# Patient Record
Sex: MALE | Race: WHITE | Employment: FULL TIME | URBAN - NONMETROPOLITAN AREA
[De-identification: names, ages, dates, MRNs, and addresses within clinical notes are randomized per-mention and may not be internally consistent; named-entity substitution may affect disease eponyms.]

---

## 2019-09-25 LAB
AVERAGE GLUCOSE: NORMAL
CHOLESTEROL, TOTAL: 148 MG/DL
CHOLESTEROL/HDL RATIO: 3.3
HBA1C MFR BLD: 5.2 %
HDLC SERPL-MCNC: 45 MG/DL (ref 35–70)
LDL CHOLESTEROL CALCULATED: 78 MG/DL (ref 0–160)
TRIGL SERPL-MCNC: 151 MG/DL
VLDLC SERPL CALC-MCNC: 103 MG/DL

## 2019-09-27 ENCOUNTER — OFFICE VISIT (OUTPATIENT)
Dept: FAMILY MEDICINE CLINIC | Age: 34
End: 2019-09-27
Payer: COMMERCIAL

## 2019-09-27 VITALS
BODY MASS INDEX: 25.82 KG/M2 | HEART RATE: 46 BPM | HEIGHT: 68 IN | WEIGHT: 170.4 LBS | SYSTOLIC BLOOD PRESSURE: 110 MMHG | DIASTOLIC BLOOD PRESSURE: 62 MMHG | RESPIRATION RATE: 16 BRPM

## 2019-09-27 DIAGNOSIS — R53.83 FATIGUE, UNSPECIFIED TYPE: ICD-10-CM

## 2019-09-27 DIAGNOSIS — J30.81 ALLERGIC RHINITIS DUE TO ANIMAL HAIR AND DANDER: ICD-10-CM

## 2019-09-27 DIAGNOSIS — Z00.01 ENCOUNTER FOR WELL ADULT EXAM WITH ABNORMAL FINDINGS: Primary | ICD-10-CM

## 2019-09-27 LAB
ALBUMIN SERPL-MCNC: 5 G/DL (ref 3.5–5.1)
ALP BLD-CCNC: 62 U/L (ref 38–126)
ALT SERPL-CCNC: 33 U/L (ref 11–66)
ANION GAP SERPL CALCULATED.3IONS-SCNC: 14 MEQ/L (ref 8–16)
AST SERPL-CCNC: 23 U/L (ref 5–40)
BASOPHILS # BLD: 1 %
BASOPHILS ABSOLUTE: 0.1 THOU/MM3 (ref 0–0.1)
BILIRUB SERPL-MCNC: 1.7 MG/DL (ref 0.3–1.2)
BUN BLDV-MCNC: 15 MG/DL (ref 7–22)
CALCIUM SERPL-MCNC: 10.1 MG/DL (ref 8.5–10.5)
CHLORIDE BLD-SCNC: 100 MEQ/L (ref 98–111)
CO2: 27 MEQ/L (ref 23–33)
CREAT SERPL-MCNC: 0.9 MG/DL (ref 0.4–1.2)
EOSINOPHIL # BLD: 3.5 %
EOSINOPHILS ABSOLUTE: 0.2 THOU/MM3 (ref 0–0.4)
ERYTHROCYTE [DISTWIDTH] IN BLOOD BY AUTOMATED COUNT: 12.1 % (ref 11.5–14.5)
ERYTHROCYTE [DISTWIDTH] IN BLOOD BY AUTOMATED COUNT: 38.8 FL (ref 35–45)
GFR SERPL CREATININE-BSD FRML MDRD: > 90 ML/MIN/1.73M2
GLUCOSE BLD-MCNC: 102 MG/DL (ref 70–108)
HCT VFR BLD CALC: 47.2 % (ref 42–52)
HEMOGLOBIN: 16.3 GM/DL (ref 14–18)
IMMATURE GRANS (ABS): 0.01 THOU/MM3 (ref 0–0.07)
IMMATURE GRANULOCYTES: 0.2 %
LYMPHOCYTES # BLD: 35.3 %
LYMPHOCYTES ABSOLUTE: 1.8 THOU/MM3 (ref 1–4.8)
MCH RBC QN AUTO: 30.4 PG (ref 26–33)
MCHC RBC AUTO-ENTMCNC: 34.5 GM/DL (ref 32.2–35.5)
MCV RBC AUTO: 88.1 FL (ref 80–94)
MONOCYTES # BLD: 7.4 %
MONOCYTES ABSOLUTE: 0.4 THOU/MM3 (ref 0.4–1.3)
NUCLEATED RED BLOOD CELLS: 0 /100 WBC
PLATELET # BLD: 228 THOU/MM3 (ref 130–400)
PMV BLD AUTO: 9.9 FL (ref 9.4–12.4)
POTASSIUM SERPL-SCNC: 4.3 MEQ/L (ref 3.5–5.2)
RBC # BLD: 5.36 MILL/MM3 (ref 4.7–6.1)
SEG NEUTROPHILS: 52.6 %
SEGMENTED NEUTROPHILS ABSOLUTE COUNT: 2.7 THOU/MM3 (ref 1.8–7.7)
SODIUM BLD-SCNC: 141 MEQ/L (ref 135–145)
TOTAL PROTEIN: 7.6 G/DL (ref 6.1–8)
TSH SERPL DL<=0.05 MIU/L-ACNC: 1.37 UIU/ML (ref 0.4–4.2)
WBC # BLD: 5.2 THOU/MM3 (ref 4.8–10.8)

## 2019-09-27 PROCEDURE — 99203 OFFICE O/P NEW LOW 30 MIN: CPT | Performed by: NURSE PRACTITIONER

## 2019-09-27 ASSESSMENT — ENCOUNTER SYMPTOMS
EYE DISCHARGE: 0
BACK PAIN: 0
DIARRHEA: 0
COUGH: 0
SINUS PRESSURE: 0
ABDOMINAL PAIN: 0
CONSTIPATION: 0
EYE REDNESS: 0
SORE THROAT: 1
EYE ITCHING: 0
SHORTNESS OF BREATH: 0
COLOR CHANGE: 0
EYE PAIN: 0

## 2019-09-27 ASSESSMENT — PATIENT HEALTH QUESTIONNAIRE - PHQ9
2. FEELING DOWN, DEPRESSED OR HOPELESS: 0
1. LITTLE INTEREST OR PLEASURE IN DOING THINGS: 0
SUM OF ALL RESPONSES TO PHQ9 QUESTIONS 1 & 2: 0
SUM OF ALL RESPONSES TO PHQ QUESTIONS 1-9: 0
SUM OF ALL RESPONSES TO PHQ QUESTIONS 1-9: 0

## 2019-09-27 NOTE — PROGRESS NOTES
[address] [place] [address]  Dept: [phone]  Dept Fax: [phone]: [phone]     Visit Date:  9/27/2019    Patient:  Ariela Robles  YOB: 1985  Age: 35 y.o. Gender: male  BMI: Body mass index is 25.91 kg/m². HPI:     Chief Complaint   Patient presents with    Established New Doctor     not feeling good. . dizzy x1 month, popping ears, sore throat       Ariela Robles is being seen today to establish care. Body mass index is 25.91 kg/m². reviewed with patient. Works as  for [employer]. Is from [place]. Has been experiencing lightheadedness, ears have been popping, and sore throat for approximately one month. Symptoms have waxed and waned. Previous minitriathalons previously. Has not run recently. States his heart rate has been low due to this. Medications  No current outpatient medications on file. The patient has No Known Allergies. Past Medical History  Concetta Pruett  has no past medical history on file. Past Surgical History  The patient  has no past surgical history on file. Family History  This patient's family history includes Early Death in his maternal grandfather; Heart Attack in his maternal grandfather; Mult Sclerosis in his maternal aunt and paternal aunt; No Known Problems in his father and mother; Parkinsonism in his paternal grandmother. Social History  Concetta Pruett  reports that he has never smoked. He has never used smokeless tobacco. He reports that he drank alcohol. He reports that he does not use drugs. Health Maintenance:    Health maintenance reviewed.    Health Maintenance   Topic Date Due    Varicella Vaccine (1 of 2 - 13+ 2-dose series) 11/27/1998    HIV screen  11/27/2000    DTaP/Tdap/Td vaccine (1 - Tdap) 11/27/2004    Flu vaccine (1) 09/01/2019    Pneumococcal 0-64 years Vaccine  Aged Out       Subjective:      Review of Systems

## 2019-09-27 NOTE — PATIENT INSTRUCTIONS
and mold. Allergies can be mild or severe. Mild allergies can be managed with home treatment. But medicine may be needed to prevent problems. Managing your allergies is an important part of staying healthy. Your doctor may suggest that you have allergy testing to help find out what is causing your allergies. When you know what things trigger your symptoms, you can avoid them. This can prevent allergy symptoms and other health problems. For severe allergies that cause reactions that affect your whole body (anaphylactic reactions), your doctor may prescribe a shot of epinephrine to carry with you in case you have a severe reaction. Learn how to give yourself the shot and keep it with you at all times. Make sure it is not . Follow-up care is a key part of your treatment and safety. Be sure to make and go to all appointments, and call your doctor if you are having problems. It's also a good idea to know your test results and keep a list of the medicines you take. How can you care for yourself at home? · If you have been told by your doctor that dust or dust mites are causing your allergy, decrease the dust around your bed:  ? Wash sheets, pillowcases, and other bedding in hot water every week. ? Use dust-proof covers for pillows, duvets, and mattresses. Avoid plastic covers because they tear easily and do not \"breathe. \" Wash as instructed on the label. ? Do not use any blankets and pillows that you do not need. ? Use blankets that you can wash in your washing machine. ? Consider removing drapes and carpets, which attract and hold dust, from your bedroom. · If you are allergic to house dust and mites, do not use home humidifiers. Your doctor can suggest ways you can control dust and mites. · Look for signs of cockroaches. Cockroaches cause allergic reactions. Use cockroach baits to get rid of them. Then, clean your home well.  Cockroaches like areas where grocery bags, newspapers, empty bottles, or

## 2019-09-30 ENCOUNTER — TELEPHONE (OUTPATIENT)
Dept: FAMILY MEDICINE CLINIC | Age: 34
End: 2019-09-30

## 2019-09-30 NOTE — TELEPHONE ENCOUNTER
----- Message from NEFTALI Jane CNP sent at 9/30/2019  7:49 AM EDT -----  Lab work look great. Blood sugar, electrolytes, kidney, liver, and blood counts are all okay.

## 2019-10-15 ENCOUNTER — OFFICE VISIT (OUTPATIENT)
Dept: FAMILY MEDICINE CLINIC | Age: 34
End: 2019-10-15
Payer: COMMERCIAL

## 2019-10-15 VITALS
DIASTOLIC BLOOD PRESSURE: 70 MMHG | RESPIRATION RATE: 14 BRPM | WEIGHT: 173 LBS | BODY MASS INDEX: 26.3 KG/M2 | SYSTOLIC BLOOD PRESSURE: 108 MMHG | HEART RATE: 76 BPM

## 2019-10-15 DIAGNOSIS — J01.90 ACUTE BACTERIAL SINUSITIS: Primary | ICD-10-CM

## 2019-10-15 DIAGNOSIS — B96.89 ACUTE BACTERIAL SINUSITIS: Primary | ICD-10-CM

## 2019-10-15 PROCEDURE — 99213 OFFICE O/P EST LOW 20 MIN: CPT | Performed by: NURSE PRACTITIONER

## 2019-10-15 RX ORDER — AMOXICILLIN AND CLAVULANATE POTASSIUM 875; 125 MG/1; MG/1
1 TABLET, FILM COATED ORAL 2 TIMES DAILY
Qty: 20 TABLET | Refills: 0 | Status: SHIPPED | OUTPATIENT
Start: 2019-10-15 | End: 2019-10-25

## 2019-10-15 RX ORDER — LORATADINE 10 MG/1
10 CAPSULE, LIQUID FILLED ORAL DAILY
COMMUNITY

## 2019-10-15 ASSESSMENT — ENCOUNTER SYMPTOMS
SINUS PRESSURE: 1
RHINORRHEA: 1
SORE THROAT: 1

## 2019-10-31 ENCOUNTER — TELEPHONE (OUTPATIENT)
Dept: FAMILY MEDICINE CLINIC | Age: 34
End: 2019-10-31

## 2020-02-11 ENCOUNTER — OFFICE VISIT (OUTPATIENT)
Dept: FAMILY MEDICINE CLINIC | Age: 35
End: 2020-02-11
Payer: COMMERCIAL

## 2020-02-11 VITALS
WEIGHT: 177 LBS | BODY MASS INDEX: 26.91 KG/M2 | SYSTOLIC BLOOD PRESSURE: 112 MMHG | RESPIRATION RATE: 14 BRPM | DIASTOLIC BLOOD PRESSURE: 70 MMHG | HEART RATE: 72 BPM

## 2020-02-11 PROCEDURE — 99213 OFFICE O/P EST LOW 20 MIN: CPT | Performed by: NURSE PRACTITIONER

## 2020-02-11 RX ORDER — AMOXICILLIN 500 MG/1
500 TABLET, FILM COATED ORAL 2 TIMES DAILY
Qty: 20 TABLET | Refills: 0 | Status: SHIPPED | OUTPATIENT
Start: 2020-02-11 | End: 2020-02-21

## 2020-02-11 ASSESSMENT — PATIENT HEALTH QUESTIONNAIRE - PHQ9
SUM OF ALL RESPONSES TO PHQ QUESTIONS 1-9: 0
2. FEELING DOWN, DEPRESSED OR HOPELESS: 0
SUM OF ALL RESPONSES TO PHQ9 QUESTIONS 1 & 2: 0
SUM OF ALL RESPONSES TO PHQ QUESTIONS 1-9: 0
1. LITTLE INTEREST OR PLEASURE IN DOING THINGS: 0

## 2020-02-11 ASSESSMENT — ENCOUNTER SYMPTOMS
SORE THROAT: 1
RHINORRHEA: 1
SHORTNESS OF BREATH: 0
COUGH: 0
SINUS PRESSURE: 1

## 2020-02-11 NOTE — PATIENT INSTRUCTIONS
chances of quitting for good. · Use a vaporizer or humidifier to add moisture to your bedroom. Follow the directions for cleaning the machine. When should you call for help? Call your doctor now or seek immediate medical care if:    · You have new or worse trouble swallowing.     · Your sore throat gets much worse on one side.    Watch closely for changes in your health, and be sure to contact your doctor if you do not get better as expected. Where can you learn more? Go to https://[employer]. org and sign in to your [employer] account. Enter O483 in the [employer] box to learn more about \"Sore Throat: Care Instructions. \"     If you do not have an account, please click on the \"Sign Up Now\" link. Current as of: July 28, 2019  Content Version: 12.3  © 7631-4085 Healthwise, Incorporated. Care instructions adapted under license by [facility] [address] If you have questions about a medical condition or this instruction, always ask your healthcare professional. [place] [address] any warranty or liability for your use of this information.

## 2023-04-18 ENCOUNTER — OFFICE VISIT (OUTPATIENT)
Dept: FAMILY MEDICINE CLINIC | Age: 38
End: 2023-04-18
Payer: COMMERCIAL

## 2023-04-18 VITALS
HEART RATE: 92 BPM | RESPIRATION RATE: 16 BRPM | SYSTOLIC BLOOD PRESSURE: 118 MMHG | WEIGHT: 171 LBS | DIASTOLIC BLOOD PRESSURE: 70 MMHG | BODY MASS INDEX: 26 KG/M2

## 2023-04-18 DIAGNOSIS — J01.10 ACUTE NON-RECURRENT FRONTAL SINUSITIS: Primary | ICD-10-CM

## 2023-04-18 PROCEDURE — 99203 OFFICE O/P NEW LOW 30 MIN: CPT | Performed by: NURSE PRACTITIONER

## 2023-04-18 RX ORDER — AMOXICILLIN AND CLAVULANATE POTASSIUM 875; 125 MG/1; MG/1
1 TABLET, FILM COATED ORAL 2 TIMES DAILY
Qty: 20 TABLET | Refills: 0 | Status: SHIPPED | OUTPATIENT
Start: 2023-04-18 | End: 2023-04-28

## 2023-04-18 SDOH — ECONOMIC STABILITY: HOUSING INSECURITY
IN THE LAST 12 MONTHS, WAS THERE A TIME WHEN YOU DID NOT HAVE A STEADY PLACE TO SLEEP OR SLEPT IN A SHELTER (INCLUDING NOW)?: NO

## 2023-04-18 SDOH — ECONOMIC STABILITY: FOOD INSECURITY: WITHIN THE PAST 12 MONTHS, YOU WORRIED THAT YOUR FOOD WOULD RUN OUT BEFORE YOU GOT MONEY TO BUY MORE.: NEVER TRUE

## 2023-04-18 SDOH — ECONOMIC STABILITY: INCOME INSECURITY: HOW HARD IS IT FOR YOU TO PAY FOR THE VERY BASICS LIKE FOOD, HOUSING, MEDICAL CARE, AND HEATING?: NOT HARD AT ALL

## 2023-04-18 SDOH — ECONOMIC STABILITY: FOOD INSECURITY: WITHIN THE PAST 12 MONTHS, THE FOOD YOU BOUGHT JUST DIDN'T LAST AND YOU DIDN'T HAVE MONEY TO GET MORE.: NEVER TRUE

## 2023-04-18 ASSESSMENT — ENCOUNTER SYMPTOMS
SHORTNESS OF BREATH: 0
RHINORRHEA: 1
COUGH: 1
SINUS PRESSURE: 1
SORE THROAT: 0

## 2023-04-18 ASSESSMENT — PATIENT HEALTH QUESTIONNAIRE - PHQ9
SUM OF ALL RESPONSES TO PHQ QUESTIONS 1-9: 0
SUM OF ALL RESPONSES TO PHQ QUESTIONS 1-9: 0
1. LITTLE INTEREST OR PLEASURE IN DOING THINGS: 0
SUM OF ALL RESPONSES TO PHQ QUESTIONS 1-9: 0
SUM OF ALL RESPONSES TO PHQ9 QUESTIONS 1 & 2: 0
2. FEELING DOWN, DEPRESSED OR HOPELESS: 0
SUM OF ALL RESPONSES TO PHQ QUESTIONS 1-9: 0

## 2023-04-18 NOTE — PROGRESS NOTES
Drea Montero (:  1985) is a 40 y.o. male,Established patient, here for evaluation of the following chief complaint(s):  Sinus Problem (X5 days ) and Established New Doctor (Patient has not been seen since 2020)         ASSESSMENT/PLAN:  1. Acute non-recurrent frontal sinusitis  - Acute  - Given improvement in symptoms, viral sinusitis is most likely  - Will put antibiotic on hold if symptoms worsen or do not completely resolve  - OTC treatments as needed for symptomatic relief  -     amoxicillin-clavulanate (AUGMENTIN) 875-125 MG per tablet; Take 1 tablet by mouth 2 times daily for 10 days, Disp-20 tablet, R-0Normal      Return if symptoms worsen or fail to improve. PHQ Scores 2023   PHQ2 Score 0 0 0   PHQ9 Score 0 0 0     Interpretation of Total Score Depression Severity: 1-4 = Minimal depression, 5-9 = Mild depression, 10-14 = Moderate depression, 15-19 = Moderately severe depression, 20-27 = Severe depression      Subjective   SUBJECTIVE/OBJECTIVE:  Patient presents today for evaluation of sinusitis and to reestablish care. Last seen in the office 3+ years ago. Health maintenance reviewed. No significant medical hx. No current specialists. Current concerns include sinus pressure. Ongoing for 5 days. Feeling a little better today. Sinusitis  This is a new problem. The current episode started in the past 7 days (5 days ago). The problem has been gradually improving since onset. There has been no fever. Associated symptoms include congestion, coughing and sinus pressure (frontal). Pertinent negatives include no ear pain, headaches, shortness of breath or sore throat (resolved). Past treatments include nasal decongestants and acetaminophen (Claritin-D, otc pain medication, nasal spray). The treatment provided mild relief. Review of Systems   Constitutional:  Positive for fatigue. Negative for fever.    HENT:  Positive for congestion, postnasal drip, rhinorrhea and

## 2023-06-21 ENCOUNTER — OFFICE VISIT (OUTPATIENT)
Dept: FAMILY MEDICINE CLINIC | Age: 38
End: 2023-06-21
Payer: COMMERCIAL

## 2023-06-21 VITALS
WEIGHT: 161.8 LBS | RESPIRATION RATE: 18 BRPM | BODY MASS INDEX: 24.6 KG/M2 | SYSTOLIC BLOOD PRESSURE: 122 MMHG | HEART RATE: 72 BPM | TEMPERATURE: 97.4 F | DIASTOLIC BLOOD PRESSURE: 68 MMHG
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
## 2023-06-21 DIAGNOSIS — S90.562A TICK BITE OF LEFT ANKLE, INITIAL ENCOUNTER: Primary | ICD-10-CM

## 2023-06-21 PROCEDURE — 99213 OFFICE O/P EST LOW 20 MIN: CPT | Performed by: FAMILY MEDICINE

## 2023-06-21 RX ORDER — DOXYCYCLINE HYCLATE 100 MG
100 TABLET ORAL 2 TIMES DAILY
Qty: 28 TABLET | Refills: 0 | Status: SHIPPED | OUTPATIENT
Start: 2023-06-21 | End: 2023-07-05

## 2023-06-21 ASSESSMENT — ENCOUNTER SYMPTOMS
DIARRHEA: 0
RHINORRHEA: 0
CONSTIPATION: 0
SINUS PRESSURE: 1
SORE THROAT: 0
EYE PAIN: 0
COUGH: 0
VOMITING: 0
CHEST TIGHTNESS: 0
WHEEZING: 0
NAUSEA: 0
ABDOMINAL PAIN: 0
BLOOD IN STOOL: 0
SHORTNESS OF BREATH: 0
BACK PAIN: 0

## 2023-06-21 NOTE — PROGRESS NOTES
Queen Cande (:  1985) is a 40 y.o. male,Established patient, here for evaluation of the following chief complaint(s):  Tick Removal (Removed on Tuesday, still swollen and now has cold symptoms)         ASSESSMENT/PLAN:  1. Tick bite of left ankle, initial encounter  -     doxycycline hyclate (VIBRA-TABS) 100 MG tablet; Take 1 tablet by mouth 2 times daily for 14 days, Disp-28 tablet, R-0Normal  -new problem, -monitor sxs, call if not improving      No follow-ups on file. Subjective   SUBJECTIVE/OBJECTIVE:  HPI   Patient here today for a check up. Reviewed BMI of 24, normal.  1 week of tick bite. Engaged about 2 days. Removed. , red. Located in [place]. Sinus sxs, congestion. No fever or chills. Some fatigue. , non smoker, pmh reviewed. Review of Systems   Constitutional:  Positive for fatigue. Negative for chills, fever and unexpected weight change. HENT:  Positive for postnasal drip and sinus pressure. Negative for congestion, ear pain, rhinorrhea and sore throat. Eyes:  Negative for pain and visual disturbance. Respiratory:  Negative for cough, chest tightness, shortness of breath and wheezing. Cardiovascular:  Negative for chest pain and palpitations. Gastrointestinal:  Negative for abdominal pain, blood in stool, constipation, diarrhea, nausea and vomiting. Genitourinary:  Negative for difficulty urinating, frequency, hematuria and urgency. Musculoskeletal:  Negative for back pain, joint swelling, myalgias and neck pain. Skin:  Positive for wound. Negative for rash. Neurological:  Negative for dizziness and headaches. Hematological:  Negative for adenopathy. Does not bruise/bleed easily. Psychiatric/Behavioral:  Negative for behavioral problems and sleep disturbance. The patient is not nervous/anxious. Objective   Physical Exam  Vitals and nursing note reviewed. Constitutional:       Appearance: He is well-developed.

## 2024-04-19 ENCOUNTER — OFFICE VISIT (OUTPATIENT)
Dept: FAMILY MEDICINE CLINIC | Age: 39
End: 2024-04-19
Payer: COMMERCIAL

## 2024-04-19 VITALS
DIASTOLIC BLOOD PRESSURE: 60 MMHG | BODY MASS INDEX: 26.46 KG/M2 | SYSTOLIC BLOOD PRESSURE: 118 MMHG | WEIGHT: 174 LBS | RESPIRATION RATE: 20 BRPM | HEART RATE: 64 BPM

## 2024-04-19 DIAGNOSIS — H92.03 ACUTE OTALGIA, BILATERAL: Primary | ICD-10-CM

## 2024-04-19 PROCEDURE — 99213 OFFICE O/P EST LOW 20 MIN: CPT | Performed by: NURSE PRACTITIONER

## 2024-04-19 RX ORDER — PREDNISONE 20 MG/1
TABLET ORAL
Qty: 15 TABLET | Refills: 0 | Status: SHIPPED | OUTPATIENT
Start: 2024-04-19 | End: 2024-04-28

## 2024-04-19 SDOH — ECONOMIC STABILITY: FOOD INSECURITY: WITHIN THE PAST 12 MONTHS, YOU WORRIED THAT YOUR FOOD WOULD RUN OUT BEFORE YOU GOT MONEY TO BUY MORE.: PATIENT DECLINED

## 2024-04-19 SDOH — ECONOMIC STABILITY: HOUSING INSECURITY
IN THE LAST 12 MONTHS, WAS THERE A TIME WHEN YOU DID NOT HAVE A STEADY PLACE TO SLEEP OR SLEPT IN A SHELTER (INCLUDING NOW)?: PATIENT DECLINED

## 2024-04-19 SDOH — ECONOMIC STABILITY: INCOME INSECURITY: HOW HARD IS IT FOR YOU TO PAY FOR THE VERY BASICS LIKE FOOD, HOUSING, MEDICAL CARE, AND HEATING?: PATIENT DECLINED

## 2024-04-19 SDOH — ECONOMIC STABILITY: FOOD INSECURITY: WITHIN THE PAST 12 MONTHS, THE FOOD YOU BOUGHT JUST DIDN'T LAST AND YOU DIDN'T HAVE MONEY TO GET MORE.: PATIENT DECLINED

## 2024-04-19 ASSESSMENT — ENCOUNTER SYMPTOMS
SINUS PRESSURE: 1
SORE THROAT: 1
COUGH: 1
HOARSE VOICE: 0
SINUS COMPLAINT: 1

## 2024-04-19 ASSESSMENT — PATIENT HEALTH QUESTIONNAIRE - PHQ9: DEPRESSION UNABLE TO ASSESS: PT REFUSES

## 2024-04-19 NOTE — PROGRESS NOTES
aMl Mei (:  1985) is a 38 y.o. male,Established patient, here for evaluation of the following chief complaint(s):  Sinus Problem (X2 weeks )      Assessment & Plan   1. Acute otalgia, bilateral  - Acute  - Appears to be d/t eustachian tube dysfunction. Likely related to allergic rhinitis and suspected recent viral illness  - Start oral steroid, oral antihistamine and nasal steroid  - Active infection seems unlikely at this time  -     predniSONE (DELTASONE) 20 MG tablet; Take 1 tablet by mouth 2 times daily at 0800 and 1400 for 5 days, THEN 1 tablet daily for 5 days., Disp-15 tablet, R-0Normal      Return if symptoms worsen or fail to improve.       Subjective   Patient presents for bilateral ear pressure.     Sinus Problem  This is a new problem. The problem has been gradually improving since onset. There has been no fever. Associated symptoms include congestion (resolved), coughing (nonproductive), ear pain (pressure, bilateral), sinus pressure (resolved) and a sore throat (resolved). Pertinent negatives include no headaches or hoarse voice.       Review of Systems   Constitutional:  Negative for fever.   HENT:  Positive for congestion (resolved), ear pain (pressure, bilateral), sinus pressure (resolved) and sore throat (resolved). Negative for hearing loss and hoarse voice.    Respiratory:  Positive for cough (nonproductive).    Musculoskeletal:  Negative for myalgias.   Neurological:  Negative for headaches.          Objective   Physical Exam  Vitals and nursing note reviewed.   Constitutional:       General: He is awake.      Appearance: Normal appearance.   HENT:      Head: Normocephalic and atraumatic.      Right Ear: Hearing, ear canal and external ear normal. Tympanic membrane is bulging. Tympanic membrane is not injected, perforated or erythematous.      Left Ear: Hearing, ear canal and external ear normal. Tympanic membrane is bulging. Tympanic membrane is not injected, perforated or

## 2024-09-12 ENCOUNTER — OFFICE VISIT (OUTPATIENT)
Dept: FAMILY MEDICINE CLINIC | Age: 39
End: 2024-09-12
Payer: COMMERCIAL

## 2024-09-12 VITALS
RESPIRATION RATE: 20 BRPM | HEART RATE: 80 BPM | WEIGHT: 175 LBS | SYSTOLIC BLOOD PRESSURE: 124 MMHG | BODY MASS INDEX: 26.61 KG/M2 | DIASTOLIC BLOOD PRESSURE: 70 MMHG

## 2024-09-12 DIAGNOSIS — R53.83 FATIGUE, UNSPECIFIED TYPE: Primary | ICD-10-CM

## 2024-09-12 LAB
25(OH)D3 SERPL-MCNC: 40 NG/ML (ref 30–100)
ALBUMIN SERPL BCG-MCNC: 5.2 G/DL (ref 3.5–5.1)
ALP SERPL-CCNC: 71 U/L (ref 38–126)
ALT SERPL W/O P-5'-P-CCNC: 22 U/L (ref 11–66)
ANION GAP SERPL CALC-SCNC: 15 MEQ/L (ref 8–16)
AST SERPL-CCNC: 21 U/L (ref 5–40)
BASOPHILS ABSOLUTE: 0.1 THOU/MM3 (ref 0–0.1)
BASOPHILS NFR BLD AUTO: 1 %
BILIRUB SERPL-MCNC: 1.3 MG/DL (ref 0.3–1.2)
BUN SERPL-MCNC: 19 MG/DL (ref 7–22)
CALCIUM SERPL-MCNC: 10 MG/DL (ref 8.5–10.5)
CHLORIDE SERPL-SCNC: 102 MEQ/L (ref 98–111)
CO2 SERPL-SCNC: 27 MEQ/L (ref 23–33)
CREAT SERPL-MCNC: 1 MG/DL (ref 0.4–1.2)
DEPRECATED RDW RBC AUTO: 38.5 FL (ref 35–45)
EOSINOPHIL NFR BLD AUTO: 6.8 %
EOSINOPHILS ABSOLUTE: 0.4 THOU/MM3 (ref 0–0.4)
ERYTHROCYTE [DISTWIDTH] IN BLOOD BY AUTOMATED COUNT: 12.3 % (ref 11.5–14.5)
FOLATE SERPL-MCNC: 15.1 NG/ML (ref 4.8–24.2)
GFR SERPL CREATININE-BSD FRML MDRD: > 90 ML/MIN/1.73M2
GLUCOSE SERPL-MCNC: 105 MG/DL (ref 70–108)
HCT VFR BLD AUTO: 47.6 % (ref 42–52)
HGB BLD-MCNC: 16.6 GM/DL (ref 14–18)
IMM GRANULOCYTES # BLD AUTO: 0.01 THOU/MM3 (ref 0–0.07)
IMM GRANULOCYTES NFR BLD AUTO: 0.2 %
LYMPHOCYTES ABSOLUTE: 1.9 THOU/MM3 (ref 1–4.8)
LYMPHOCYTES NFR BLD AUTO: 32.5 %
MCH RBC QN AUTO: 30.2 PG (ref 26–33)
MCHC RBC AUTO-ENTMCNC: 34.9 GM/DL (ref 32.2–35.5)
MCV RBC AUTO: 86.5 FL (ref 80–94)
MONOCYTES ABSOLUTE: 0.5 THOU/MM3 (ref 0.4–1.3)
MONOCYTES NFR BLD AUTO: 7.7 %
NEUTROPHILS ABSOLUTE: 3.1 THOU/MM3 (ref 1.8–7.7)
NEUTROPHILS NFR BLD AUTO: 51.8 %
NRBC BLD AUTO-RTO: 0 /100 WBC
PLATELET # BLD AUTO: 240 THOU/MM3 (ref 130–400)
PMV BLD AUTO: 10.3 FL (ref 9.4–12.4)
POTASSIUM SERPL-SCNC: 4.2 MEQ/L (ref 3.5–5.2)
PROT SERPL-MCNC: 7.9 G/DL (ref 6.1–8)
RBC # BLD AUTO: 5.5 MILL/MM3 (ref 4.7–6.1)
SODIUM SERPL-SCNC: 144 MEQ/L (ref 135–145)
TSH SERPL DL<=0.005 MIU/L-ACNC: 1.55 UIU/ML (ref 0.4–4.2)
VIT B12 SERPL-MCNC: 965 PG/ML (ref 211–911)
WBC # BLD AUTO: 5.9 THOU/MM3 (ref 4.8–10.8)

## 2024-09-12 PROCEDURE — 99214 OFFICE O/P EST MOD 30 MIN: CPT | Performed by: NURSE PRACTITIONER

## 2024-09-12 ASSESSMENT — PATIENT HEALTH QUESTIONNAIRE - PHQ9
SUM OF ALL RESPONSES TO PHQ9 QUESTIONS 1 & 2: 0
SUM OF ALL RESPONSES TO PHQ QUESTIONS 1-9: 0
1. LITTLE INTEREST OR PLEASURE IN DOING THINGS: NOT AT ALL
SUM OF ALL RESPONSES TO PHQ QUESTIONS 1-9: 0
2. FEELING DOWN, DEPRESSED OR HOPELESS: NOT AT ALL

## 2024-09-13 ENCOUNTER — TELEPHONE (OUTPATIENT)
Dept: FAMILY MEDICINE CLINIC | Age: 39
End: 2024-09-13

## 2024-09-13 DIAGNOSIS — R53.83 OTHER FATIGUE: ICD-10-CM

## 2024-09-13 DIAGNOSIS — R42 DIZZINESS: Primary | ICD-10-CM

## 2024-10-17 ENCOUNTER — TELEPHONE (OUTPATIENT)
Dept: FAMILY MEDICINE CLINIC | Age: 39
End: 2024-10-17

## 2024-10-17 NOTE — TELEPHONE ENCOUNTER
Pt wanted to schedule with Dr Rodriguez  Future Appointments   Date Time Provider Department Center   12/17/2024 12:40 PM Mark Quinones, DO [facility]

## 2024-10-17 NOTE — TELEPHONE ENCOUNTER
Based on my schedule right now, the best I can offer is my next new pt apt  He is welcome to schedule/EST with other providers in this office who could accommodate sooner  Thanks!

## 2024-10-17 NOTE — TELEPHONE ENCOUNTER
Pt requesting a new pt appt  Next available 12/17    Pt c/o low energy sore throat off and on  Severe fatigue  Arm spasms    He did lift weights three days a week-stopped for awhile  Trying to restart slowly

## 2024-12-16 SDOH — HEALTH STABILITY: PHYSICAL HEALTH
ON AVERAGE, HOW MANY DAYS PER WEEK DO YOU ENGAGE IN MODERATE TO STRENUOUS EXERCISE (LIKE A BRISK WALK)?: PATIENT DECLINED

## 2024-12-17 ENCOUNTER — OFFICE VISIT (OUTPATIENT)
Dept: FAMILY MEDICINE CLINIC | Age: 39
End: 2024-12-17
Payer: COMMERCIAL

## 2024-12-17 VITALS
HEART RATE: 79 BPM | TEMPERATURE: 97.6 F | RESPIRATION RATE: 18 BRPM | BODY MASS INDEX: 26.86 KG/M2 | SYSTOLIC BLOOD PRESSURE: 128 MMHG | OXYGEN SATURATION: 99 % | HEIGHT: 68 IN | WEIGHT: 177.2 LBS | DIASTOLIC BLOOD PRESSURE: 70 MMHG

## 2024-12-17 DIAGNOSIS — Z00.00 VISIT FOR PREVENTIVE HEALTH EXAMINATION: Primary | ICD-10-CM

## 2024-12-17 PROCEDURE — 99395 PREV VISIT EST AGE 18-39: CPT | Performed by: FAMILY MEDICINE

## 2024-12-17 NOTE — PROGRESS NOTES
Chief Complaint   Patient presents with    Annual Exam     Well Visit     History obtained from the patient.    SUBJECTIVE:  Mal Mei is a 39 y.o. male that presents today for establishing care with new physician, etc. New patient, [facility]. Seen by Eve العراقي in [facility].     -Prev Med: Vaccines reviewed. Reviewed if routine labs are due. Denies family history of breast, colon, ovarian cancer. Denies LUTS or bowl habit changes. Denies fevers, chills, night sweats or wt loss.     Paternal grandfather with hx of prostate cancer    Diet - good  Exercise - good  Sleep - good      Age/Gender Health Maintenance    Lipid -       Lab Results   Component Value Date    CHOL 148 09/25/2019     Lab Results   Component Value Date    TRIG 151 09/25/2019     Lab Results   Component Value Date    HDL 45 09/25/2019     Lab Results   Component Value Date    LDL 78 09/25/2019       DM Screen -   Lab Results   Component Value Date/Time    GLUCOSE 105 09/12/2024 08:33 AM     Lab Results   Component Value Date/Time    LABA1C 5.2 09/25/2019 12:00 AM       Colon Cancer Screening - age 45  Lung Cancer Screening - Never smoker    Tetanus - UTD AUG 2015  Influenza Vaccine - declines DEC 2024  Pneumonia Vaccine - age 65  Zoster - age 50     PSA testing discussion - age 55  No results found for: \"PSA\"      AAA Screening - never smoker        Current Outpatient Medications   Medication Sig Dispense Refill    loratadine (CLARITIN) 10 MG capsule Take 1 capsule by mouth daily       No current facility-administered medications for this visit.     No orders of the defined types were placed in this encounter.      All medications reviewed and reconciled, including OTC and herbal medications. Updated list given to patient.       There are no problems to display for this patient.      Past Medical History:   Diagnosis Date    Allergic rhinitis          History reviewed. No pertinent surgical history.      No Known

## 2025-05-22 ENCOUNTER — OFFICE VISIT (OUTPATIENT)
Dept: FAMILY MEDICINE CLINIC | Age: 40
End: 2025-05-22

## 2025-05-22 VITALS
DIASTOLIC BLOOD PRESSURE: 80 MMHG | OXYGEN SATURATION: 98 % | RESPIRATION RATE: 16 BRPM | HEART RATE: 77 BPM | TEMPERATURE: 97.7 F | HEIGHT: 68 IN | WEIGHT: 171.4 LBS | BODY MASS INDEX: 25.98 KG/M2 | SYSTOLIC BLOOD PRESSURE: 130 MMHG

## 2025-05-22 DIAGNOSIS — J06.9 VIRAL URI: Primary | ICD-10-CM

## 2025-05-22 LAB — STREPTOCOCCUS A RNA: NEGATIVE

## 2025-05-22 SDOH — ECONOMIC STABILITY: FOOD INSECURITY: WITHIN THE PAST 12 MONTHS, YOU WORRIED THAT YOUR FOOD WOULD RUN OUT BEFORE YOU GOT MONEY TO BUY MORE.: NEVER TRUE

## 2025-05-22 SDOH — ECONOMIC STABILITY: FOOD INSECURITY: WITHIN THE PAST 12 MONTHS, THE FOOD YOU BOUGHT JUST DIDN'T LAST AND YOU DIDN'T HAVE MONEY TO GET MORE.: NEVER TRUE

## 2025-05-22 ASSESSMENT — PATIENT HEALTH QUESTIONNAIRE - PHQ9
SUM OF ALL RESPONSES TO PHQ QUESTIONS 1-9: 0
2. FEELING DOWN, DEPRESSED OR HOPELESS: NOT AT ALL
SUM OF ALL RESPONSES TO PHQ QUESTIONS 1-9: 0
1. LITTLE INTEREST OR PLEASURE IN DOING THINGS: NOT AT ALL

## 2025-05-22 NOTE — PROGRESS NOTES
Chief Complaint   Patient presents with    Other     Sore throat, headache, cough, congestion, body aches  Started 2 days ago      History obtained from the patient.    SUBJECTIVE:  Mal Mei is a 39 y.o. male that presents today for     -URI type sxs:   2 days  Sore throat  Body aches  Occ cough and nasal congestion  No fever  No SOB  A little better this afternoon    Fever - No  Runny nose or congestion -  mild   Cough -  occ  Sore throat -  Yes  Headache, fatigue, joint pains, muscle aches -  Yes  Double Sickening - No  Shortness of breath/Wheezing? -  No  Nausea/Vomiting/Diarrhea?  No  Sick contacts - No  Maxillary Tooth Pain -  No      Age/Gender Health Maintenance    Lipid -       Lab Results   Component Value Date    CHOL 148 09/25/2019     Lab Results   Component Value Date    TRIG 151 09/25/2019     Lab Results   Component Value Date    HDL 45 09/25/2019     Lab Results   Component Value Date    LDL 78 09/25/2019       DM Screen -   Lab Results   Component Value Date/Time    GLUCOSE 105 09/12/2024 08:33 AM     Lab Results   Component Value Date/Time    LABA1C 5.2 09/25/2019 12:00 AM       Colon Cancer Screening - age 45  Lung Cancer Screening - Never smoker    Tetanus - UTD AUG 2015  Influenza Vaccine - declines DEC 2024  Pneumonia Vaccine - age 65  Zoster - age 50     PSA testing discussion - age 55  No results found for: \"PSA\"      AAA Screening - never smoker        Current Outpatient Medications   Medication Sig Dispense Refill    loratadine (CLARITIN) 10 MG capsule Take 1 capsule by mouth daily       No current facility-administered medications for this visit.     No orders of the defined types were placed in this encounter.      All medications reviewed and reconciled, including OTC and herbal medications. Updated list given to patient.       There are no active problems to display for this patient.      Past Medical History:   Diagnosis Date    Allergic rhinitis        History reviewed. No

## 2025-05-24 LAB — BACTERIA THROAT AEROBE CULT: NORMAL
